# Patient Record
Sex: FEMALE | Race: WHITE | NOT HISPANIC OR LATINO | Employment: FULL TIME | URBAN - METROPOLITAN AREA
[De-identification: names, ages, dates, MRNs, and addresses within clinical notes are randomized per-mention and may not be internally consistent; named-entity substitution may affect disease eponyms.]

---

## 2018-01-30 ENCOUNTER — RADIANT APPOINTMENT (OUTPATIENT)
Dept: GENERAL RADIOLOGY | Facility: CLINIC | Age: 28
End: 2018-01-30
Attending: FAMILY MEDICINE
Payer: MEDICAID

## 2018-01-30 ENCOUNTER — OFFICE VISIT (OUTPATIENT)
Dept: FAMILY MEDICINE | Facility: CLINIC | Age: 28
End: 2018-01-30
Payer: MEDICAID

## 2018-01-30 VITALS
SYSTOLIC BLOOD PRESSURE: 112 MMHG | TEMPERATURE: 97.8 F | HEIGHT: 67 IN | BODY MASS INDEX: 23.07 KG/M2 | DIASTOLIC BLOOD PRESSURE: 66 MMHG | HEART RATE: 70 BPM | RESPIRATION RATE: 16 BRPM | WEIGHT: 147 LBS | OXYGEN SATURATION: 100 %

## 2018-01-30 DIAGNOSIS — M25.522 LEFT ELBOW PAIN: Primary | ICD-10-CM

## 2018-01-30 DIAGNOSIS — M25.522 LEFT ELBOW PAIN: ICD-10-CM

## 2018-01-30 PROCEDURE — 99213 OFFICE O/P EST LOW 20 MIN: CPT | Performed by: FAMILY MEDICINE

## 2018-01-30 PROCEDURE — 73070 X-RAY EXAM OF ELBOW: CPT | Mod: LT

## 2018-01-30 RX ORDER — SERTRALINE HYDROCHLORIDE 100 MG/1
TABLET, FILM COATED ORAL
COMMUNITY
Start: 2017-11-20

## 2018-01-30 RX ORDER — GABAPENTIN 300 MG/1
CAPSULE ORAL
COMMUNITY
Start: 2017-11-20

## 2018-01-30 NOTE — LETTER
January 31, 2018      Chhaya Andre  [address] [address]        Dear ,    We are writing to inform you of your test results.    Here are your results for your recent xray which was normal. Please call or message me if you have questions or concerns.     Resulted Orders   XR Elbow Left 2 Views    Narrative    ELBOW TWO VIEWS LEFT  1/30/2018 3:12 PM     HISTORY: Left elbow pain    COMPARISON: None.    FINDINGS: There is no significant degenerative change. No posterior  fat-pad is seen. No convincing anterior sail sign is seen. There is no  acute fracture or dislocation. There are no worrisome bony lesions.      Impression    IMPRESSION: No acute osseous abnormality demonstrated.    DULCE BURDICK MD       If you have any questions or concerns, please call the clinic at the number listed above.       Sincerely,    Amee Sanchez MD/nr

## 2018-01-30 NOTE — MR AVS SNAPSHOT
"              After Visit Summary   2018    Chhaya Andre    MRN: [id number]           Patient Information     Date Of Birth          1990        Visit Information        Provider Department      2018 2:40 PM Amee Sanchez MD [facility]        Today's Diagnoses     Left elbow pain    -  1       Follow-ups after your visit        Who to contact     If you have questions or need follow up information about today's clinic visit or your schedule please contact [facility] directly at [phone].  Normal or non-critical lab and imaging results will be communicated to you by GameLogichart, letter or phone within 4 business days after the clinic has received the results. If you do not hear from us within 7 days, please contact the clinic through GameLog[employer]art or phone. If you have a critical or abnormal lab result, we will notify you by phone as soon as possible.  Submit refill requests through MedNews or call your pharmacy and they will forward the refill request to us. Please allow 3 business days for your refill to be completed.          Additional Information About Your Visit        MyChart Information     MedNews lets you send messages to your doctor, view your test results, renew your prescriptions, schedule appointments and more. To sign up, go to [email or web address] . Click on \"Log in\" on the left side of the screen, which will take you to the Welcome page. Then click on \"Sign up Now\" on the right side of the page.     You will be asked to enter the access code listed below, as well as some personal information. Please follow the directions to create your username and password.     Your access code is: [email or web address]  Expires: 2018  4:11 PM     Your access code will  in 90 days. If you need help or a new code, please call your [facility] or [phone].        Care EveryWhere ID     This is your Care EveryWhere ID. This could be used by other organizations " "to access your [place] medical records  [id number]        Your Vitals Were     Pulse Temperature Respirations Height Pulse Oximetry BMI (Body Mass Index)    70 97.8  F (36.6  C) (Oral) 16 5' 7\" (1.702 m) 100% 23.02 kg/m2       Blood Pressure from Last 3 Encounters:   01/30/18 112/66   08/09/16 113/69   08/06/16 110/65    Weight from Last 3 Encounters:   01/30/18 147 lb (66.7 kg)   08/09/16 136 lb (61.7 kg)   08/06/16 139 lb (63 kg)               Primary Care Provider Fax #    Physician No Ref-Primary [phone]       No address on file        Equal Access to Services     NOAM RAZA : Chasidy Antonio, wasilvano tee, qaybta kaalmada rich, joe colin . So [facility] [phone].    ATENCIÓN: Si habla español, tiene a butler disposición servicios gratuitos de asistencia lingüística. Llame al [phone].    We comply with applicable federal civil rights laws and [place] laws. We do not discriminate on the basis of race, color, national origin, age, disability, sex, sexual orientation, or gender identity.            Thank you!     Thank you for choosing [facility]  for your care. Our goal is always to provide you with excellent care. Hearing back from our patients is one way we can continue to improve our services. Please take a few minutes to complete the written survey that you may receive in the mail after your visit with us. Thank you!             Your Updated Medication List - Protect others around you: Learn how to safely use, store and throw away your medicines at www.disposemymeds.org.          This list is accurate as of 1/30/18  4:11 PM.  Always use your most recent med list.                   Brand Name Dispense Instructions for use Diagnosis    acetaminophen 500 MG tablet    TYLENOL    100 tablet    Take 1-2 tablets (500-1,000 mg) by mouth every 6 hours as needed for mild pain    Acute pain of right knee       * cyclobenzaprine 10 MG tablet    " FLEXERIL    14 tablet    Take 1 tab po before bed prn back pain    Back pain       * cyclobenzaprine 10 MG tablet    FLEXERIL    30 tablet    Take 1 tablet (10 mg) by mouth 3 times daily as needed for muscle spasms    Back strain, initial encounter       gabapentin 300 MG capsule    NEURONTIN     Take one capsule twice daily and two at bedtime        paragard intrauterine copper      1 each by Intrauterine route once        promethazine 25 MG tablet    PHENERGAN    20 tablet    Take 1 tablet (25 mg) by mouth every 6 hours as needed for nausea        sertraline 100 MG tablet    ZOLOFT     Start one half tablet daily for 10 days, then increase to 100 mg po daily in the AM        * Notice:  This list has 2 medication(s) that are the same as other medications prescribed for you. Read the directions carefully, and ask your doctor or other care provider to review them with you.

## 2018-01-30 NOTE — PROGRESS NOTES
"  SUBJECTIVE:   Chhaya Andre is a 27 year old female who presents to clinic today for the following health issues:    About one month ago pt slipped on her back steps and landed on her left elbow. She had left elbow swelling and pain if she touched the elbow. Last night she fell again and landed on left elbow. She is now experiencing left elbow pain, swelling and bruising.     Problem list and histories reviewed & adjusted, as indicated.  Additional history: as documented        Reviewed and updated as needed this visit by clinical staff       Reviewed and updated as needed this visit by Provider         ROS:  Constitutional, HEENT, cardiovascular, pulmonary, gi and gu systems are negative, except as otherwise noted.    OBJECTIVE:     /66  Pulse 70  Temp 97.8  F (36.6  C) (Oral)  Resp 16  Ht 5' 7\" (1.702 m)  Wt 147 lb (66.7 kg)  SpO2 100%  BMI 23.02 kg/m2  Body mass index is 23.02 kg/(m^2).  GENERAL: healthy, alert and no distress  EYES: Eyes grossly normal to inspection  HENT:  nose and mouth without ulcers or lesions  MS/SKIN: left elbow with mild edema, ecchymosis and tenderness, normal ROM    Diagnostic Test Results:  Xray - see below     ASSESSMENT/PLAN:     1. Left elbow pain  - XR Elbow Left 2 Views;per my view normal; official read pending  - recommended ACE wrap or elbow pad, ice PRN and ibuprofen PRN  - Follow if symptoms worsen or fail to improve.    Amee Sanchez MD  [facility]    "

## 2022-08-12 ENCOUNTER — APPOINTMENT (OUTPATIENT)
Dept: CT IMAGING | Facility: CLINIC | Age: 32
End: 2022-08-12
Attending: PHYSICIAN ASSISTANT

## 2022-08-12 ENCOUNTER — HOSPITAL ENCOUNTER (EMERGENCY)
Facility: CLINIC | Age: 32
Discharge: HOME | End: 2022-08-12
Attending: PHYSICIAN ASSISTANT | Admitting: PHYSICIAN ASSISTANT

## 2022-08-12 VITALS
DIASTOLIC BLOOD PRESSURE: 84 MMHG | WEIGHT: 150 LBS | RESPIRATION RATE: 16 BRPM | SYSTOLIC BLOOD PRESSURE: 119 MMHG | OXYGEN SATURATION: 99 % | TEMPERATURE: 97.8 F | HEART RATE: 75 BPM | HEIGHT: 66 IN | BODY MASS INDEX: 24.11 KG/M2

## 2022-08-12 DIAGNOSIS — S09.90XA INJURY OF HEAD, INITIAL ENCOUNTER: ICD-10-CM

## 2022-08-12 PROCEDURE — 70450 CT HEAD/BRAIN W/O DYE: CPT

## 2022-08-12 PROCEDURE — 250N000011 HC RX IP 250 OP 636: Performed by: PHYSICIAN ASSISTANT

## 2022-08-12 PROCEDURE — 250N000013 HC RX MED GY IP 250 OP 250 PS 637: Performed by: PHYSICIAN ASSISTANT

## 2022-08-12 PROCEDURE — 99284 EMERGENCY DEPT VISIT MOD MDM: CPT | Mod: 25

## 2022-08-12 RX ORDER — ONDANSETRON 4 MG/1
4 TABLET, ORALLY DISINTEGRATING ORAL EVERY 8 HOURS PRN
Qty: 10 TABLET | Refills: 0 | Status: SHIPPED | OUTPATIENT
Start: 2022-08-12

## 2022-08-12 RX ORDER — ONDANSETRON 4 MG/1
4 TABLET, ORALLY DISINTEGRATING ORAL ONCE
Status: COMPLETED | OUTPATIENT
Start: 2022-08-12 | End: 2022-08-12

## 2022-08-12 RX ORDER — ACETAMINOPHEN 500 MG
500 TABLET ORAL ONCE
Status: COMPLETED | OUTPATIENT
Start: 2022-08-12 | End: 2022-08-12

## 2022-08-12 RX ADMIN — ONDANSETRON 4 MG: 4 TABLET, ORALLY DISINTEGRATING ORAL at 21:54

## 2022-08-12 RX ADMIN — ACETAMINOPHEN 500 MG: 500 TABLET, FILM COATED ORAL at 21:54

## 2022-08-12 ASSESSMENT — ENCOUNTER SYMPTOMS
SHORTNESS OF BREATH: 0
HEMATURIA: 0
FATIGUE: 1
NAUSEA: 1
DIFFICULTY URINATING: 0
EYE DISCHARGE: 0
ARTHRALGIAS: 1
NECK PAIN: 1
WEAKNESS: 1
DYSURIA: 0

## 2022-08-12 ASSESSMENT — ACTIVITIES OF DAILY LIVING (ADL): ADLS_ACUITY_SCORE: 35

## 2022-08-12 NOTE — Clinical Note
Chhaya Andre was seen and treated in our emergency department on 8/12/2022.  She may return to work on 08/15/2022.  Off work today through Monday due to head injury then light duty for the next week: No working from height.  No lifting more than 10 lbs.      If you have any questions or concerns, please don't hesitate to call.      Colleen Son PA-C

## 2022-08-13 NOTE — ED PROVIDER NOTES
History   Chief Complaint:  Head Injury     The history is provided by the patient and a significant other.      Chhaya Andre is a 32 year old female who presents with head injury. She is accompanied by her partner. Today at 19:05, she was at work, leaning against a wall, when a metal bar (unspecified the details of the metal bar) popped out of place and hit her head. She endorses head injury, but is unsure if she lost consciousness. She states that she was feeling fine at first, but after some time she started having difficulty recalling some events from the accident. Her partner was present at the scene and states he saw her go down into the ground. He reports that she didn't faint, but was holding her head following the injury. Additionally, he reports that she was on the ground for 7-8 minutes in a fetal position. She got up after some time and sat down in her office. About 5 minutes later, she started feeling fatigue and pain. She endorses neck pain, nausea, and generalized weakness. She denies chest pain, shortness of breath, nosebleeds, blood in ear, bowel movement change, urinary symptoms, and numbness. She denies pregnancy or breast feeding. She denies history of diabetes or hypertension. She denies any other injuries or symptoms.     Review of Systems   Constitutional: Positive for fatigue.   HENT: Negative for nosebleeds.    Eyes: Negative for discharge.   Respiratory: Negative for shortness of breath.    Cardiovascular: Negative for chest pain.   Gastrointestinal: Positive for nausea.        (-) bowel movement change    Genitourinary: Negative for difficulty urinating, dysuria and hematuria.   Musculoskeletal: Positive for arthralgias and neck pain.   Neurological: Positive for weakness.        (+) head injury   (-) loss of consciousness    All other systems reviewed and are negative.      Allergies:  Latex     Medications:  Neurontin   Phenergan   Zoloft     Past Medical History:     Generalized  "anxiety disorder, with panic attacks   Low grade squamous intraepithelial lesion on cytologic smear of cervix   Recurrent major depressive disorder in partial remission   Anxiety   Psychophysiological insomnia   Depression    Hx of suicide attempt    ADD (attention deficit disorder)     Past Surgical History:    The patient denies past surgical history.     Family History:    Anxiety disorder     Social History:  The patient presents to the ED with her partner.  Arrived by private vehicle.   Occupation: works at [employer].   No PCP    Physical Exam     Patient Vitals for the past 24 hrs:   BP Temp Temp src Pulse Resp SpO2 Height Weight   08/12/22 2100 119/84 -- -- 75 -- 99 % -- --   08/12/22 2033 131/71 97.8  F (36.6  C) Oral 76 16 100 % 1.676 m (5' 6\") 68 kg (150 lb)   08/12/22 2032 131/71 98.1  F (36.7  C) Temporal 82 14 100 % -- --     Physical Exam  Vitals and nursing note reviewed.   Constitutional:       General: She is not in acute distress.     Appearance: Normal appearance. She is not ill-appearing, toxic-appearing or diaphoretic.   HENT:      Head: Normocephalic and atraumatic.      Right Ear: Tympanic membrane, ear canal and external ear normal.      Left Ear: Tympanic membrane, ear canal and external ear normal.      Ears:      Comments: No hemotympanum      Mouth/Throat:      Mouth: Mucous membranes are moist.      Pharynx: Oropharynx is clear.      Comments: No intraoral wounds. Clear speech. No trismus.  Eyes:      Pupils: Pupils are equal, round, and reactive to light.   Neck:      Comments: Mild TTP to lateral neck soft tissues. No posterior midline TTP or pain to midline with ROM of neck. No pain with axial compression. Ranging neck with ease.   Cardiovascular:      Rate and Rhythm: Normal rate and regular rhythm.      Pulses: Normal pulses.      Heart sounds: Normal heart sounds.   Pulmonary:      Effort: Pulmonary effort is normal. No respiratory distress.      Breath sounds: Normal breath " sounds.   Abdominal:      Palpations: Abdomen is soft.      Tenderness: There is no abdominal tenderness.   Musculoskeletal:         General: Normal range of motion.      Cervical back: Normal range of motion and neck supple. Tenderness present. No rigidity.   Skin:     General: Skin is warm.      Capillary Refill: Capillary refill takes less than 2 seconds.   Neurological:      General: No focal deficit present.      Mental Status: She is alert and oriented to person, place, and time.      Sensory: No sensory deficit.      Motor: No weakness.   Psychiatric:         Mood and Affect: Mood normal.         Behavior: Behavior normal.         Thought Content: Thought content normal.         Judgment: Judgment normal.       Emergency Department Course     Imaging:  Head CT w/o contrast   Final Result   IMPRESSION:   1.  No acute intracranial process.        Report per radiology    Emergency Department Course:    Reviewed:  I reviewed nursing notes, vitals, past medical history and Care Everywhere    Assessments:   I obtained history and examined the patient as noted above.    Patient went to CT.     I rechecked the patient and explained findings.    At this point I feel that the patient is safe for discharge, and the patient agrees.    Interventions:   Zofran 4 mg Oral    Tylenol 500 mg Oral     Disposition:  The patient was discharged to home.     Impression & Plan     Medical Decision Makin y/o female presents with SO for evaluation of head injury sustained while at work at ~7pm.  SO saw event.  Metal spencer fell that was leaning against a wall and struck pt to head.  No LOC but pt does not remember event entirely.  HA and dizziness and nausea.     Discussed will check CT head for ICH/skull Fx.  Discussed neck pain suspected to be strain, not suspected she sustained spinal Fx or acute cord injury or internal organ injury or bleed in regards to fall. Tylenol and zofran for symptoms.  They are happy  with plan.     Update: Discussed CT head reassuring and she is felt stable for discharge.  She remains alert and grossly neuro intact on re-eval with appropriate mentation.  Will be with SO. Discussed will treat for head injury and importance of obtaining a PCP (on-call contact provided) and ideal to recheck this next week.  Discussed head injury activity precautions. Pt and SO educated on S/S that should prompt ED re-eval.  Questions answered. Verbalized understanding. Comfortable with plan and appreciative.     Diagnosis:    ICD-10-CM    1. Injury of head, initial encounter  S09.90XA        Discharge Medications:  Discharge Medication List as of 8/12/2022 10:40 PM      START taking these medications    Details   ondansetron (ZOFRAN ODT) 4 MG ODT tab Take 1 tablet (4 mg) by mouth every 8 hours as needed for nausea or vomiting, Disp-10 tablet, R-0, E-Prescribe             Scribe Disclosure:  I, Frieda Hart, am serving as a scribe at 9:04 PM on 8/12/2022 to document services personally performed by Colleen Son PA-C based on my observations and the provider's statements to me.            Colleen Son PA-C  08/12/22 5080

## 2022-08-13 NOTE — ED TRIAGE NOTES
Hit her head while at work at around 1930 reports memory issues not, unsure if she LOC. C/o pain to the left temporal area.      Triage Assessment     Row Name 08/12/22 2029       Triage Assessment (Adult)    Airway WDL WDL       Respiratory WDL    Respiratory WDL WDL       Skin Circulation/Temperature WDL    Skin Circulation/Temperature WDL WDL       Cardiac WDL    Cardiac WDL WDL       Peripheral/Neurovascular WDL    Peripheral Neurovascular WDL WDL       Cognitive/Neuro/Behavioral WDL    Cognitive/Neuro/Behavioral WDL WDL